# Patient Record
Sex: MALE | Race: WHITE | NOT HISPANIC OR LATINO | URBAN - METROPOLITAN AREA
[De-identification: names, ages, dates, MRNs, and addresses within clinical notes are randomized per-mention and may not be internally consistent; named-entity substitution may affect disease eponyms.]

---

## 2018-05-16 RX ORDER — GLUCOSAM/CHONDRO/HERB 149/HYAL 750-100 MG
2-3 TABLET ORAL DAILY
COMMUNITY
Start: 2012-04-04

## 2018-05-16 RX ORDER — ASPIRIN 81 MG/1
81 TABLET ORAL DAILY
COMMUNITY
Start: 2012-04-04

## 2018-05-16 RX ORDER — LOSARTAN POTASSIUM 50 MG/1
50 TABLET ORAL DAILY
COMMUNITY
Start: 2015-10-16 | End: 2020-10-02 | Stop reason: ALTCHOICE

## 2018-05-16 RX ORDER — MULTIVITAMIN
1 TABLET ORAL DAILY
COMMUNITY
Start: 2012-04-04

## 2018-05-16 RX ORDER — METHYLPREDNISOLONE 4 MG
500 TABLET, DOSE PACK ORAL DAILY
COMMUNITY
Start: 2017-11-06

## 2018-05-16 RX ORDER — LATANOPROST 50 UG/ML
1 SOLUTION/ DROPS OPHTHALMIC DAILY
COMMUNITY
Start: 2013-10-09

## 2018-05-16 RX ORDER — GLIMEPIRIDE 4 MG/1
4 TABLET ORAL 2 TIMES DAILY
COMMUNITY
Start: 2012-04-04

## 2018-05-16 RX ORDER — BLACK COHOSH ROOT 200 MG
500 CAPSULE ORAL DAILY
COMMUNITY
Start: 2012-04-04

## 2018-05-16 RX ORDER — VITAMIN E 268 MG
1 CAPSULE ORAL DAILY
COMMUNITY
Start: 2012-04-04

## 2018-05-16 RX ORDER — GABAPENTIN 300 MG/1
1 CAPSULE ORAL 2 TIMES DAILY
COMMUNITY
Start: 2017-04-17

## 2018-05-16 RX ORDER — METFORMIN HYDROCHLORIDE 750 MG/1
750 TABLET, EXTENDED RELEASE ORAL EVERY EVENING
COMMUNITY
Start: 2013-10-09

## 2018-05-16 RX ORDER — ATORVASTATIN CALCIUM 40 MG/1
40 TABLET, FILM COATED ORAL DAILY
COMMUNITY
Start: 2013-10-09

## 2018-05-16 RX ORDER — METOPROLOL SUCCINATE 25 MG/1
25 TABLET, EXTENDED RELEASE ORAL DAILY
COMMUNITY
Start: 2017-08-17 | End: 2018-09-06 | Stop reason: SDUPTHER

## 2018-05-16 RX ORDER — FUROSEMIDE 20 MG/1
0.5 TABLET ORAL AS NEEDED
COMMUNITY
Start: 2013-10-09 | End: 2018-12-03

## 2018-05-21 ENCOUNTER — OFFICE VISIT (OUTPATIENT)
Dept: CARDIOLOGY | Facility: CLINIC | Age: 83
End: 2018-05-21
Attending: INTERNAL MEDICINE
Payer: COMMERCIAL

## 2018-05-21 VITALS
HEART RATE: 67 BPM | DIASTOLIC BLOOD PRESSURE: 70 MMHG | HEIGHT: 67 IN | BODY MASS INDEX: 32.68 KG/M2 | SYSTOLIC BLOOD PRESSURE: 128 MMHG | WEIGHT: 208.2 LBS

## 2018-05-21 DIAGNOSIS — E78.2 MIXED HYPERLIPIDEMIA: ICD-10-CM

## 2018-05-21 DIAGNOSIS — I10 BENIGN ESSENTIAL HYPERTENSION: ICD-10-CM

## 2018-05-21 DIAGNOSIS — E78.5 DYSLIPIDEMIA: Primary | ICD-10-CM

## 2018-05-21 DIAGNOSIS — I25.10 CORONARY ARTERY DISEASE INVOLVING NATIVE CORONARY ARTERY OF NATIVE HEART WITHOUT ANGINA PECTORIS: ICD-10-CM

## 2018-05-21 PROCEDURE — 99214 OFFICE O/P EST MOD 30 MIN: CPT | Performed by: INTERNAL MEDICINE

## 2018-05-21 PROCEDURE — 93000 ELECTROCARDIOGRAM COMPLETE: CPT | Performed by: INTERNAL MEDICINE

## 2018-05-21 RX ORDER — BLOOD-GLUCOSE METER
KIT MISCELLANEOUS 2 TIMES DAILY
Refills: 2 | COMMUNITY
Start: 2018-03-28

## 2018-05-21 ASSESSMENT — ENCOUNTER SYMPTOMS
DYSPNEA ON EXERTION: 0
VOMITING: 0
ABDOMINAL PAIN: 0
ALTERED MENTAL STATUS: 0
CLAUDICATION: 0
SHORTNESS OF BREATH: 0
COUGH: 0
BACK PAIN: 0
SYNCOPE: 0
NEAR-SYNCOPE: 0
HEMATURIA: 0
DIARRHEA: 0
DIZZINESS: 0
WEAKNESS: 0
PND: 0
MYALGIAS: 0
IRREGULAR HEARTBEAT: 0
ORTHOPNEA: 0
NAUSEA: 0
BRUISES/BLEEDS EASILY: 0
PALPITATIONS: 0

## 2018-05-21 NOTE — ASSESSMENT & PLAN NOTE
Blood pressure is controlled and is less than 130/80.  Dimitris will continue on the losartan and Toprol.

## 2018-05-21 NOTE — ASSESSMENT & PLAN NOTE
No anginal symptoms or symptoms of congestive heart failure.    Dimitris does have some lower extremity edema and I told him if needed he can take an additional dose of the diuretic.    I also counseled him on low salt and trying to keep his legs elevated when possible.

## 2018-05-21 NOTE — PROGRESS NOTES
Cardiology Note       Reason for visit:Coronary artery disease   Solis returns today for follow-up accompanied by his son.  He has been doing well without any chest discomfort or shortness of breath.    He continues to go to the [facility] and does his water aerobics.    He tells me he has been going fishing and recently caught a 21 inch Trail.    There has not been any palpitations or dizziness.  He does get some lower extremity edema but this has been chronic and unchanged.  There has not been any PND or orthopnea.    He has not taken his diuretic as of yet today.  He does have 1+ edema of the lower extremities bilaterally.           Past Medical History:   Diagnosis Date   • CAD (coronary artery disease)    • Cellulitis 10/2017    LLE   • Diabetes (CMS/HCC) ([facility])    • Hypertension    • Lipid disorder    • Peripheral neuropathy (CMS/[facility])    • PVD (peripheral vascular disease) (CMS/Formerly [facility]) ([facility])      Past Surgical History:   Procedure Laterality Date   • CORONARY ANGIOPLASTY  1996    PTCA of LAD     No known allergies  Current Outpatient Prescriptions   Medication Sig Dispense Refill   • ascorbic acid (ascorbic acid with justino hips) 500 mg tablet Take 500 mg by mouth daily.     • aspirin (ASPIRIN LOW DOSE) 81 mg enteric coated tablet Take 81 mg by mouth daily.     • atorvastatin (LIPITOR) 40 mg tablet Take 40 mg by mouth daily.     • FREESTYLE LITE STRIPS strip 2 (two) times a day. USE 1 BY MISC.(NON-DRUG COMBO ROUTE) ROUTE 2 TIMES EVERY DAY  2   • furosemide (LASIX) 20 mg tablet Take 0.5 tablets by mouth as needed.     • gabapentin (NEURONTIN) 300 mg capsule Take 2 capsules by mouth daily.     • glimepiride (AMARYL) 4 mg tablet Take 4 mg by mouth 2 (two) times a day.     • glucosamine sulfate (GLUCOSAMINE) 500 mg tablet Take 500 mg by mouth daily.     • latanoprost (XALATAN) 0.005 % ophthalmic solution Administer 1 drop into affected eye(s) daily.     • losartan (COZAAR) 50 mg tablet Take 50 mg by mouth daily.     •  metFORMIN XR (GLUCOPHAGE-XR) 750 mg 24 hr tablet Take 750 mg by mouth every evening. With evening meal     • metoprolol succinate XL (TOPROL XL) 25 mg 24 hr tablet Take 25 mg by mouth daily.     • multivitamin (THERAGRAN) tablet Take 1 tablet by mouth daily. With food     • omega 3-dha-epa-fish oil 1,000 mg (120 mg-180 mg) capsule Take 2-3 tablets by mouth daily.     • vitamin E 400 unit capsule Take 1 tablet by mouth daily.       No current facility-administered medications for this visit.      Social History     Social History   • Marital status:      Spouse name: N/A   • Number of children: N/A   • Years of education: N/A     Social History Main Topics   • Smoking status: Never Smoker   • Smokeless tobacco: Never Used   • Alcohol use None   • Drug use: Unknown   • Sexual activity: Not Asked     Other Topics Concern   • None     Social History Narrative   • None     Family History   Problem Relation Age of Onset   • Heart attack Neg Hx          Review of Systems   Constitution: Negative for weakness.   HENT: Negative for nosebleeds.    Eyes: Negative for visual disturbance.   Cardiovascular: Negative for chest pain, claudication, dyspnea on exertion, irregular heartbeat, leg swelling, near-syncope, orthopnea, palpitations, paroxysmal nocturnal dyspnea and syncope.   Respiratory: Negative for cough and shortness of breath.    Hematologic/Lymphatic: Does not bruise/bleed easily.   Skin: Negative for rash.   Musculoskeletal: Positive for joint pain. Negative for back pain and myalgias.   Gastrointestinal: Negative for abdominal pain, diarrhea, nausea and vomiting.   Genitourinary: Negative for hematuria.   Neurological: Negative for dizziness.   Psychiatric/Behavioral: Negative for altered mental status.   Allergic/Immunologic: Negative for hives.      Objective    Vitals:    05/21/18 0924   BP: 128/70   Pulse: 67      Physical Exam   Constitutional: He is oriented to person, place, and time. He appears  well-developed and well-nourished. He is cooperative.   HENT:   Head: Normocephalic and atraumatic.   Eyes: No scleral icterus.   Neck: No JVD present. Carotid bruit is not present.   Cardiovascular: Normal rate, regular rhythm, S1 normal and S2 normal.  Exam reveals gallop and S4. Exam reveals no friction rub.    No murmur heard.  Pulses:       Carotid pulses are 2+ on the right side, and 2+ on the left side.       Dorsalis pedis pulses are 1+ on the right side, and 1+ on the left side.        Posterior tibial pulses are 1+ on the right side, and 1+ on the left side.   Pulmonary/Chest: Effort normal and breath sounds normal.   Abdominal: Soft. Bowel sounds are normal. There is no tenderness.   Musculoskeletal: He exhibits edema.   Trace 1+ edema of the lower extemities bilaterally   Neurological: He is alert and oriented to person, place, and time.   Skin: Skin is warm and dry.   Psychiatric: He has a normal mood and affect. His behavior is normal.         No results found for: WBC, HGB, PLT, CHOL, TRIG, HDL, LDLDIRECT, ALT, AST, NA, K, CREATININE, TSH, INR, GLUF, HGBA1C        Cardiovascular Procedures:  CATH LAB:  PCI (PTCA LAD) - 1996   ECHO/MUGA:  Echo (Left ventricular hypertrophy. Ejection fraction 55-60%. Aortic sclerosis.) - 10/3/2012   Echo (EF 0.60 (60%), Hypertensive heart disease.  Left ventricular hypertrophy.  Aortic sclerosis.  Decreased left ventricular compliance.) - 10/21/2015   STRESS TESTS:  Prasad MPI (Normal nuclear perfusion stress test. Images reveal normal wall motion. Ejection fraction 64%.) - 10/5/2012         ECG.  Normal sinus rhythm, first-degree AV block, right bundle branch block and nonspecific T-wave abnormality.  No change compared to previous EKG.     Assessment/Plan    Coronary artery disease involving native coronary artery without angina pectoris  No anginal symptoms or symptoms of congestive heart failure.    Dimitris does have some lower extremity edema and I told him if needed he  can take an additional dose of the diuretic.    I also counseled him on low salt and trying to keep his legs elevated when possible.    Benign essential hypertension  Blood pressure is controlled and is less than 130/80.  Dimitris will continue on the losartan and Toprol.    Mixed hyperlipidemia  Continue atorvastatin 40 mg daily.                Thank you for allowing me to participate in the care of this patient.  If you have any questions please don't hesitate to contact me.     Dc Renner MD [facility]   5/21/2018  10:16 AM

## 2018-05-21 NOTE — LETTER
May 21, 2018     Alexis Adams MD  [address] [facility] [address]    Patient: Solis Dolan   YOB: 1925   Date of Visit: 5/21/2018       Dear Dr. Adams:    Thank you for referring Solis Dolan to me for evaluation. Below are my notes for this consultation.    If you have questions, please do not hesitate to call me. I look forward to following your patient along with you.         Sincerely,        Dc Renner MD        CC: No Recipients  Dc Renner MD  5/21/2018 10:17 AM  Signed     Cardiology Note       Reason for visit:Coronary artery disease   Solis returns today for follow-up accompanied by his son.  He has been doing well without any chest discomfort or shortness of breath.    He continues to go to the [facility] and does his water aerobics.    He tells me he has been going fishing and recently caught a 21 inch Trail.    There has not been any palpitations or dizziness.  He does get some lower extremity edema but this has been chronic and unchanged.  There has not been any PND or orthopnea.    He has not taken his diuretic as of yet today.  He does have 1+ edema of the lower extremities bilaterally.           Past Medical History:   Diagnosis Date   • CAD (coronary artery disease)    • Cellulitis 10/2017    LLE   • Diabetes (CMS/HCC) ([facility])    • Hypertension    • Lipid disorder    • Peripheral neuropathy (CMS/HCC)    • PVD (peripheral vascular disease) (CMS/HCC) ([facility])      Past Surgical History:   Procedure Laterality Date   • CORONARY ANGIOPLASTY  1996    PTCA of LAD     No known allergies  Current Outpatient Prescriptions   Medication Sig Dispense Refill   • ascorbic acid (ascorbic acid with justino hips) 500 mg tablet Take 500 mg by mouth daily.     • aspirin (ASPIRIN LOW DOSE) 81 mg enteric coated tablet Take 81 mg by mouth daily.     • atorvastatin (LIPITOR) 40 mg tablet Take 40 mg by mouth daily.     • FREESTYLE LITE STRIPS strip 2 (two) times a day. USE 1 BY MISC.(NON-DRUG  COMBO ROUTE) ROUTE 2 TIMES EVERY DAY  2   • furosemide (LASIX) 20 mg tablet Take 0.5 tablets by mouth as needed.     • gabapentin (NEURONTIN) 300 mg capsule Take 2 capsules by mouth daily.     • glimepiride (AMARYL) 4 mg tablet Take 4 mg by mouth 2 (two) times a day.     • glucosamine sulfate (GLUCOSAMINE) 500 mg tablet Take 500 mg by mouth daily.     • latanoprost (XALATAN) 0.005 % ophthalmic solution Administer 1 drop into affected eye(s) daily.     • losartan (COZAAR) 50 mg tablet Take 50 mg by mouth daily.     • metFORMIN XR (GLUCOPHAGE-XR) 750 mg 24 hr tablet Take 750 mg by mouth every evening. With evening meal     • metoprolol succinate XL (TOPROL XL) 25 mg 24 hr tablet Take 25 mg by mouth daily.     • multivitamin (THERAGRAN) tablet Take 1 tablet by mouth daily. With food     • omega 3-dha-epa-fish oil 1,000 mg (120 mg-180 mg) capsule Take 2-3 tablets by mouth daily.     • vitamin E 400 unit capsule Take 1 tablet by mouth daily.       No current facility-administered medications for this visit.      Social History     Social History   • Marital status:      Spouse name: N/A   • Number of children: N/A   • Years of education: N/A     Social History Main Topics   • Smoking status: Never Smoker   • Smokeless tobacco: Never Used   • Alcohol use None   • Drug use: Unknown   • Sexual activity: Not Asked     Other Topics Concern   • None     Social History Narrative   • None     Family History   Problem Relation Age of Onset   • Heart attack Neg Hx          Review of Systems   Constitution: Negative for weakness.   HENT: Negative for nosebleeds.    Eyes: Negative for visual disturbance.   Cardiovascular: Negative for chest pain, claudication, dyspnea on exertion, irregular heartbeat, leg swelling, near-syncope, orthopnea, palpitations, paroxysmal nocturnal dyspnea and syncope.   Respiratory: Negative for cough and shortness of breath.    Hematologic/Lymphatic: Does not bruise/bleed easily.   Skin: Negative  for rash.   Musculoskeletal: Positive for joint pain. Negative for back pain and myalgias.   Gastrointestinal: Negative for abdominal pain, diarrhea, nausea and vomiting.   Genitourinary: Negative for hematuria.   Neurological: Negative for dizziness.   Psychiatric/Behavioral: Negative for altered mental status.   Allergic/Immunologic: Negative for hives.      Objective    Vitals:    05/21/18 0924   BP: 128/70   Pulse: 67      Physical Exam   Constitutional: He is oriented to person, place, and time. He appears well-developed and well-nourished. He is cooperative.   HENT:   Head: Normocephalic and atraumatic.   Eyes: No scleral icterus.   Neck: No JVD present. Carotid bruit is not present.   Cardiovascular: Normal rate, regular rhythm, S1 normal and S2 normal.  Exam reveals gallop and S4. Exam reveals no friction rub.    No murmur heard.  Pulses:       Carotid pulses are 2+ on the right side, and 2+ on the left side.       Dorsalis pedis pulses are 1+ on the right side, and 1+ on the left side.        Posterior tibial pulses are 1+ on the right side, and 1+ on the left side.   Pulmonary/Chest: Effort normal and breath sounds normal.   Abdominal: Soft. Bowel sounds are normal. There is no tenderness.   Musculoskeletal: He exhibits edema.   Trace 1+ edema of the lower extemities bilaterally   Neurological: He is alert and oriented to person, place, and time.   Skin: Skin is warm and dry.   Psychiatric: He has a normal mood and affect. His behavior is normal.         No results found for: WBC, HGB, PLT, CHOL, TRIG, HDL, LDLDIRECT, ALT, AST, NA, K, CREATININE, TSH, INR, GLUF, HGBA1C        Cardiovascular Procedures:  CATH LAB:  PCI (PTCA LAD) - 1996   ECHO/MUGA:  Echo (Left ventricular hypertrophy. Ejection fraction 55-60%. Aortic sclerosis.) - 10/3/2012   Echo (EF 0.60 (60%), Hypertensive heart disease.  Left ventricular hypertrophy.  Aortic sclerosis.  Decreased left ventricular compliance.) - 10/21/2015   STRESS  TESTS:  Prasad MPI (Normal nuclear perfusion stress test. Images reveal normal wall motion. Ejection fraction 64%.) - 10/5/2012         ECG.  Normal sinus rhythm, first-degree AV block, right bundle branch block and nonspecific T-wave abnormality.  No change compared to previous EKG.     Assessment/Plan    Coronary artery disease involving native coronary artery without angina pectoris  No anginal symptoms or symptoms of congestive heart failure.    Dimitris does have some lower extremity edema and I told him if needed he can take an additional dose of the diuretic.    I also counseled him on low salt and trying to keep his legs elevated when possible.    Benign essential hypertension  Blood pressure is controlled and is less than 130/80.  Dimitris will continue on the losartan and Toprol.    Mixed hyperlipidemia  Continue atorvastatin 40 mg daily.                Thank you for allowing me to participate in the care of this patient.  If you have any questions please don't hesitate to contact me.     Dc Renner MD [facility]   5/21/2018  10:16 AM

## 2018-09-06 RX ORDER — METOPROLOL SUCCINATE 25 MG/1
25 TABLET, EXTENDED RELEASE ORAL DAILY
Qty: 90 TABLET | Refills: 3 | Status: SHIPPED | OUTPATIENT
Start: 2018-09-06 | End: 2018-12-03

## 2018-12-03 ENCOUNTER — OFFICE VISIT (OUTPATIENT)
Dept: CARDIOLOGY | Facility: CLINIC | Age: 83
End: 2018-12-03
Payer: COMMERCIAL

## 2018-12-03 VITALS
WEIGHT: 206 LBS | HEART RATE: 69 BPM | SYSTOLIC BLOOD PRESSURE: 138 MMHG | BODY MASS INDEX: 33.11 KG/M2 | RESPIRATION RATE: 16 BRPM | HEIGHT: 66 IN | DIASTOLIC BLOOD PRESSURE: 58 MMHG

## 2018-12-03 DIAGNOSIS — I10 BENIGN ESSENTIAL HYPERTENSION: Primary | ICD-10-CM

## 2018-12-03 DIAGNOSIS — I25.10 CORONARY ARTERY DISEASE INVOLVING NATIVE CORONARY ARTERY OF NATIVE HEART WITHOUT ANGINA PECTORIS: ICD-10-CM

## 2018-12-03 DIAGNOSIS — E78.2 MIXED HYPERLIPIDEMIA: ICD-10-CM

## 2018-12-03 PROCEDURE — 99214 OFFICE O/P EST MOD 30 MIN: CPT | Performed by: INTERNAL MEDICINE

## 2018-12-03 PROCEDURE — 93000 ELECTROCARDIOGRAM COMPLETE: CPT | Performed by: INTERNAL MEDICINE

## 2018-12-03 ASSESSMENT — ENCOUNTER SYMPTOMS
IRREGULAR HEARTBEAT: 0
COUGH: 0
NEAR-SYNCOPE: 0
DYSPNEA ON EXERTION: 1
SYNCOPE: 0
SHORTNESS OF BREATH: 0
CLAUDICATION: 0
NAUSEA: 0
BACK PAIN: 0
PARESTHESIAS: 1
ALTERED MENTAL STATUS: 0
PALPITATIONS: 0
MYALGIAS: 0
ABDOMINAL PAIN: 0
WEAKNESS: 0
PND: 0
DIZZINESS: 0
HEMATURIA: 0
ORTHOPNEA: 0
DIARRHEA: 0
BRUISES/BLEEDS EASILY: 0
VOMITING: 0

## 2018-12-03 NOTE — LETTER
December 3, 2018     Alexis Adams MD  [address] [facility] [address]    Patient: Solis Dolan   YOB: 1925   Date of Visit: 12/3/2018       Dear Dr. Adams:    Thank you for referring Solis Dolan to me for evaluation. Below are my notes for this consultation.    If you have questions, please do not hesitate to call me. I look forward to following your patient along with you.         Sincerely,        Dc Renner MD        CC: No Recipients  Dc Renner MD  12/3/2018  9:59 AM  Signed     Cardiology Note       Reason for visit: Coronary artery disease.   Solis returns today for follow-up for his coronary artery disease accompanied by his son.    He has been doing well without any anginal symptoms.    He continues to be active and does daily exercise, sudoku and word finding puzzles  to keep himself mentally active and sharp.    His son tells me that he had been in the hospital over the summer with a cellulitis of his left lower extremity but otherwise has not had any problems.    There is not been any PND, orthopnea or chest discomfort.           Past Medical History:   Diagnosis Date   • CAD (coronary artery disease)    • Cellulitis 10/2017    LLE   • Diabetes (CMS/HCC) ([facility])    • Hypertension    • Lipid disorder    • Peripheral neuropathy    • PVD (peripheral vascular disease) (CMS/[facility]) ([facility])      Past Surgical History:   Procedure Laterality Date   • CORONARY ANGIOPLASTY  1996    PTCA of LAD     No known allergies  Current Outpatient Prescriptions   Medication Sig Dispense Refill   • apixaban (ELIQUIS) 5 mg tablet Take 5 mg by mouth 2 (two) times a day.     • ascorbic acid (ascorbic acid with justino hips) 500 mg tablet Take 500 mg by mouth daily.     • aspirin (ASPIRIN LOW DOSE) 81 mg enteric coated tablet Take 81 mg by mouth daily.     • atorvastatin (LIPITOR) 40 mg tablet Take 40 mg by mouth daily.     • FREESTYLE LITE STRIPS strip 2 (two) times a day. USE 1 BY MISC.(NON-DRUG  COMBO ROUTE) ROUTE 2 TIMES EVERY DAY  2   • gabapentin (NEURONTIN) 300 mg capsule Take 2 capsules by mouth daily.     • glimepiride (AMARYL) 4 mg tablet Take 4 mg by mouth 2 (two) times a day.     • glucosamine sulfate (GLUCOSAMINE) 500 mg tablet Take 500 mg by mouth daily.     • latanoprost (XALATAN) 0.005 % ophthalmic solution Administer 1 drop into affected eye(s) daily.     • losartan (COZAAR) 50 mg tablet Take 50 mg by mouth daily.     • metFORMIN XR (GLUCOPHAGE-XR) 750 mg 24 hr tablet Take 750 mg by mouth every evening. With evening meal     • multivitamin (THERAGRAN) tablet Take 1 tablet by mouth daily. With food     • omega 3-dha-epa-fish oil 1,000 mg (120 mg-180 mg) capsule Take 2-3 tablets by mouth daily.     • vitamin E 400 unit capsule Take 1 tablet by mouth daily.       No current facility-administered medications for this visit.      Social History     Social History   • Marital status:      Spouse name: N/A   • Number of children: N/A   • Years of education: N/A     Social History Main Topics   • Smoking status: Never Smoker   • Smokeless tobacco: Never Used   • Alcohol use None   • Drug use: Unknown   • Sexual activity: Not Asked     Other Topics Concern   • None     Social History Narrative   • None     Family History   Problem Relation Age of Onset   • Heart attack Neg Hx          Review of Systems   Constitution: Negative for weakness.   HENT: Negative for nosebleeds.    Eyes: Negative for visual disturbance.   Cardiovascular: Positive for dyspnea on exertion. Negative for chest pain, claudication, irregular heartbeat, leg swelling, near-syncope, orthopnea, palpitations, paroxysmal nocturnal dyspnea and syncope.   Respiratory: Negative for cough and shortness of breath.    Hematologic/Lymphatic: Does not bruise/bleed easily.   Skin: Negative for rash.   Musculoskeletal: Negative for back pain, joint pain and myalgias.   Gastrointestinal: Negative for abdominal pain, diarrhea, nausea and  vomiting.   Genitourinary: Negative for hematuria.   Neurological: Positive for paresthesias. Negative for dizziness.   Psychiatric/Behavioral: Negative for altered mental status.   Allergic/Immunologic: Negative for hives.      Objective    Vitals:    12/03/18 0928   BP: (!) 138/58   Pulse: 69   Resp: 16      Wt Readings from Last 3 Encounters:   12/03/18 93.4 kg (206 lb)   05/21/18 94.4 kg (208 lb 3.2 oz)      Physical Exam   Constitutional: He is oriented to person, place, and time. He appears well-developed and well-nourished. He is cooperative.   HENT:   Head: Normocephalic and atraumatic.   Eyes: No scleral icterus.   Neck: No JVD present. Carotid bruit is not present.   Cardiovascular: Normal rate, regular rhythm, S1 normal and S2 normal.  Exam reveals gallop and S4. Exam reveals no friction rub.    No murmur heard.  Pulses:       Carotid pulses are 2+ on the right side, and 2+ on the left side.       Dorsalis pedis pulses are 2+ on the right side, and 2+ on the left side.        Posterior tibial pulses are 2+ on the right side, and 2+ on the left side.   Pulmonary/Chest: Effort normal and breath sounds normal.   Abdominal: Soft. Bowel sounds are normal. There is no tenderness.   Musculoskeletal: He exhibits no edema.   Neurological: He is alert and oriented to person, place, and time.   Skin: Skin is warm and dry.   Chronic venous stasis changes of the lower extremities bilaterally   Psychiatric: He has a normal mood and affect. His behavior is normal.               Cardiovascular Procedures:  CATH LAB:  PCI (PTCA LAD) - 1996   ECHO/MUGA:  Echo (Left ventricular hypertrophy. Ejection fraction 55-60%. Aortic sclerosis.) - 10/3/2012   Echo (EF 0.60 (60%), Hypertensive heart disease.  Left ventricular hypertrophy.  Aortic sclerosis.  Decreased left ventricular compliance.) - 10/21/2015   STRESS TESTS:  Prasad MPI (Normal nuclear perfusion stress test. Images reveal normal wall motion. Ejection fraction 64%.) -  10/5/2012          ECG.  Normal sinus rhythm, leftward axis and right bundle branch block.  No change compared to previous EKG.     Assessment/Plan    Coronary artery disease involving native coronary artery without angina pectoris  Solis has been stable without any anginal symptoms.  He will continue on the aspirin and high intensity statin therapy.    Benign essential hypertension  His blood pressure is mildly elevated today 138 but he tells me otherwise his blood pressure has been controlled.    He will continue on the losartan 50 mg daily.  If needed this can be increased to 100 mg should his blood pressure be elevated on subsequent visits.    Mixed hyperlipidemia  Continue atorvastatin 40 mg daily.  If you could forward a copy of his lipid profile when he has a performed I would greatly appreciate it.      Solis will be coming to you for his day-to-day care.          Thank you for allowing me to participate in the care of this patient.  If you have any questions please don't hesitate to contact me.     Dc Renner MD [facility]   12/3/2018  9:58 AM

## 2018-12-03 NOTE — PROGRESS NOTES
Cardiology Note       Reason for visit: Coronary artery disease.   Solis returns today for follow-up for his coronary artery disease accompanied by his son.    He has been doing well without any anginal symptoms.    He continues to be active and does daily exercise, sudoku and word finding puzzles  to keep himself mentally active and sharp.    His son tells me that he had been in the hospital over the summer with a cellulitis of his left lower extremity but otherwise has not had any problems.    There is not been any PND, orthopnea or chest discomfort.           Past Medical History:   Diagnosis Date   • CAD (coronary artery disease)    • Cellulitis 10/2017    LLE   • Diabetes (CMS/[facility]) ([facility])    • Hypertension    • Lipid disorder    • Peripheral neuropathy    • PVD (peripheral vascular disease) (CMS/[facility]) ([facility])      Past Surgical History:   Procedure Laterality Date   • CORONARY ANGIOPLASTY  1996    PTCA of LAD     No known allergies  Current Outpatient Prescriptions   Medication Sig Dispense Refill   • apixaban (ELIQUIS) 5 mg tablet Take 5 mg by mouth 2 (two) times a day.     • ascorbic acid (ascorbic acid with justino hips) 500 mg tablet Take 500 mg by mouth daily.     • aspirin (ASPIRIN LOW DOSE) 81 mg enteric coated tablet Take 81 mg by mouth daily.     • atorvastatin (LIPITOR) 40 mg tablet Take 40 mg by mouth daily.     • FREESTYLE LITE STRIPS strip 2 (two) times a day. USE 1 BY MISC.(NON-DRUG COMBO ROUTE) ROUTE 2 TIMES EVERY DAY  2   • gabapentin (NEURONTIN) 300 mg capsule Take 2 capsules by mouth daily.     • glimepiride (AMARYL) 4 mg tablet Take 4 mg by mouth 2 (two) times a day.     • glucosamine sulfate (GLUCOSAMINE) 500 mg tablet Take 500 mg by mouth daily.     • latanoprost (XALATAN) 0.005 % ophthalmic solution Administer 1 drop into affected eye(s) daily.     • losartan (COZAAR) 50 mg tablet Take 50 mg by mouth daily.     • metFORMIN XR (GLUCOPHAGE-XR) 750 mg 24 hr tablet Take 750 mg by mouth every  evening. With evening meal     • multivitamin (THERAGRAN) tablet Take 1 tablet by mouth daily. With food     • omega 3-dha-epa-fish oil 1,000 mg (120 mg-180 mg) capsule Take 2-3 tablets by mouth daily.     • vitamin E 400 unit capsule Take 1 tablet by mouth daily.       No current facility-administered medications for this visit.      Social History     Social History   • Marital status:      Spouse name: N/A   • Number of children: N/A   • Years of education: N/A     Social History Main Topics   • Smoking status: Never Smoker   • Smokeless tobacco: Never Used   • Alcohol use None   • Drug use: Unknown   • Sexual activity: Not Asked     Other Topics Concern   • None     Social History Narrative   • None     Family History   Problem Relation Age of Onset   • Heart attack Neg Hx          Review of Systems   Constitution: Negative for weakness.   HENT: Negative for nosebleeds.    Eyes: Negative for visual disturbance.   Cardiovascular: Positive for dyspnea on exertion. Negative for chest pain, claudication, irregular heartbeat, leg swelling, near-syncope, orthopnea, palpitations, paroxysmal nocturnal dyspnea and syncope.   Respiratory: Negative for cough and shortness of breath.    Hematologic/Lymphatic: Does not bruise/bleed easily.   Skin: Negative for rash.   Musculoskeletal: Negative for back pain, joint pain and myalgias.   Gastrointestinal: Negative for abdominal pain, diarrhea, nausea and vomiting.   Genitourinary: Negative for hematuria.   Neurological: Positive for paresthesias. Negative for dizziness.   Psychiatric/Behavioral: Negative for altered mental status.   Allergic/Immunologic: Negative for hives.      Objective    Vitals:    12/03/18 0928   BP: (!) 138/58   Pulse: 69   Resp: 16      Wt Readings from Last 3 Encounters:   12/03/18 93.4 kg (206 lb)   05/21/18 94.4 kg (208 lb 3.2 oz)      Physical Exam   Constitutional: He is oriented to person, place, and time. He appears well-developed and  well-nourished. He is cooperative.   HENT:   Head: Normocephalic and atraumatic.   Eyes: No scleral icterus.   Neck: No JVD present. Carotid bruit is not present.   Cardiovascular: Normal rate, regular rhythm, S1 normal and S2 normal.  Exam reveals gallop and S4. Exam reveals no friction rub.    No murmur heard.  Pulses:       Carotid pulses are 2+ on the right side, and 2+ on the left side.       Dorsalis pedis pulses are 2+ on the right side, and 2+ on the left side.        Posterior tibial pulses are 2+ on the right side, and 2+ on the left side.   Pulmonary/Chest: Effort normal and breath sounds normal.   Abdominal: Soft. Bowel sounds are normal. There is no tenderness.   Musculoskeletal: He exhibits no edema.   Neurological: He is alert and oriented to person, place, and time.   Skin: Skin is warm and dry.   Chronic venous stasis changes of the lower extremities bilaterally   Psychiatric: He has a normal mood and affect. His behavior is normal.               Cardiovascular Procedures:  CATH LAB:  PCI (PTCA LAD) - 1996   ECHO/MUGA:  Echo (Left ventricular hypertrophy. Ejection fraction 55-60%. Aortic sclerosis.) - 10/3/2012   Echo (EF 0.60 (60%), Hypertensive heart disease.  Left ventricular hypertrophy.  Aortic sclerosis.  Decreased left ventricular compliance.) - 10/21/2015   STRESS TESTS:  Prasad MPI (Normal nuclear perfusion stress test. Images reveal normal wall motion. Ejection fraction 64%.) - 10/5/2012          ECG.  Normal sinus rhythm, leftward axis and right bundle branch block.  No change compared to previous EKG.     Assessment/Plan    Coronary artery disease involving native coronary artery without angina pectoris  Solis has been stable without any anginal symptoms.  He will continue on the aspirin and high intensity statin therapy.    Benign essential hypertension  His blood pressure is mildly elevated today 138 but he tells me otherwise his blood pressure has been controlled.    He will continue  on the losartan 50 mg daily.  If needed this can be increased to 100 mg should his blood pressure be elevated on subsequent visits.    Mixed hyperlipidemia  Continue atorvastatin 40 mg daily.  If you could forward a copy of his lipid profile when he has a performed I would greatly appreciate it.      Solis will be coming to you for his day-to-day care.          Thank you for allowing me to participate in the care of this patient.  If you have any questions please don't hesitate to contact me.     Dc Renner MD [facility]   12/3/2018  9:58 AM

## 2018-12-03 NOTE — ASSESSMENT & PLAN NOTE
Continue atorvastatin 40 mg daily.  If you could forward a copy of his lipid profile when he has a performed I would greatly appreciate it.

## 2018-12-03 NOTE — ASSESSMENT & PLAN NOTE
His blood pressure is mildly elevated today 138 but he tells me otherwise his blood pressure has been controlled.    He will continue on the losartan 50 mg daily.  If needed this can be increased to 100 mg should his blood pressure be elevated on subsequent visits.

## 2018-12-03 NOTE — ASSESSMENT & PLAN NOTE
Solis has been stable without any anginal symptoms.  He will continue on the aspirin and high intensity statin therapy.

## 2019-06-05 ENCOUNTER — OFFICE VISIT (OUTPATIENT)
Dept: CARDIOLOGY | Facility: CLINIC | Age: 84
End: 2019-06-05
Payer: COMMERCIAL

## 2019-06-05 VITALS
WEIGHT: 200 LBS | HEART RATE: 75 BPM | HEIGHT: 66 IN | SYSTOLIC BLOOD PRESSURE: 130 MMHG | DIASTOLIC BLOOD PRESSURE: 64 MMHG | RESPIRATION RATE: 18 BRPM | BODY MASS INDEX: 32.14 KG/M2

## 2019-06-05 DIAGNOSIS — I10 ESSENTIAL HYPERTENSION: Primary | ICD-10-CM

## 2019-06-05 DIAGNOSIS — I10 BENIGN ESSENTIAL HYPERTENSION: ICD-10-CM

## 2019-06-05 DIAGNOSIS — I25.10 CORONARY ARTERY DISEASE INVOLVING NATIVE CORONARY ARTERY OF NATIVE HEART WITHOUT ANGINA PECTORIS: ICD-10-CM

## 2019-06-05 DIAGNOSIS — E78.2 MIXED HYPERLIPIDEMIA: ICD-10-CM

## 2019-06-05 PROCEDURE — 99214 OFFICE O/P EST MOD 30 MIN: CPT | Performed by: INTERNAL MEDICINE

## 2019-06-05 PROCEDURE — 93000 ELECTROCARDIOGRAM COMPLETE: CPT | Performed by: INTERNAL MEDICINE

## 2019-06-05 ASSESSMENT — ENCOUNTER SYMPTOMS
ORTHOPNEA: 0
PND: 0
MYALGIAS: 0
COUGH: 0
DYSPNEA ON EXERTION: 1
NAUSEA: 0
BACK PAIN: 0
IRREGULAR HEARTBEAT: 0
PALPITATIONS: 0
DIARRHEA: 0
SHORTNESS OF BREATH: 0
BRUISES/BLEEDS EASILY: 0
CLAUDICATION: 0
NEAR-SYNCOPE: 0
ALTERED MENTAL STATUS: 0
HEMATURIA: 0
ABDOMINAL PAIN: 0
SYNCOPE: 0
DIZZINESS: 0
VOMITING: 0
WEAKNESS: 0

## 2019-06-05 NOTE — PROGRESS NOTES
Cardiology Note       Reason for visit: Coronary artery disease.   Solis returns today for follow-up accompanied by his son.  He tells me that overall he has been doing well.  He continues to go to the  and do water aerobics.    He does tell me that he feels that he gets slightly more dyspneic with moderate effort such as 125 feet of walking that he did compared to 6 months ago.  He has not had any rest or nocturnal symptoms.  He has not had any chest discomfort.  There has not been any PND orthopnea.  He does get some lower extremity edema which has been chronic and he takes his Lasix on a regular basis.               Past Medical History:   Diagnosis Date   • CAD (coronary artery disease)    • Cellulitis 10/2017    LLE   • Diabetes (CMS/[facility]) ([facility])    • Hypertension    • Lipid disorder    • Peripheral neuropathy    • PVD (peripheral vascular disease) (CMS/[facility]) ([facility])      Past Surgical History:   Procedure Laterality Date   • CORONARY ANGIOPLASTY  1996    PTCA of LAD     No known allergies  Current Outpatient Prescriptions   Medication Sig Dispense Refill   • ascorbic acid (ascorbic acid with justino hips) 500 mg tablet Take 500 mg by mouth daily.     • aspirin (ASPIRIN LOW DOSE) 81 mg enteric coated tablet Take 81 mg by mouth daily.     • atorvastatin (LIPITOR) 40 mg tablet Take 40 mg by mouth daily.     • cholecalciferol, vitamin D3, (VITAMIN D3 ORAL) Take by mouth.     • FREESTYLE LITE STRIPS strip 2 (two) times a day. USE 1 BY MISC.(NON-DRUG COMBO ROUTE) ROUTE 2 TIMES EVERY DAY  2   • gabapentin (NEURONTIN) 300 mg capsule Take 1 capsule by mouth 2 (two) times a day.       • glimepiride (AMARYL) 4 mg tablet Take 4 mg by mouth 2 (two) times a day.     • glucosamine sulfate (GLUCOSAMINE) 500 mg tablet Take 500 mg by mouth daily.     • latanoprost (XALATAN) 0.005 % ophthalmic solution Administer 1 drop into affected eye(s) daily.     • losartan (COZAAR) 50 mg tablet Take 50 mg by mouth daily.     • metFORMIN XR  (GLUCOPHAGE-XR) 750 mg 24 hr tablet Take 750 mg by mouth every evening. With evening meal     • multivitamin (THERAGRAN) tablet Take 1 tablet by mouth daily. With food     • omega 3-dha-epa-fish oil 1,000 mg (120 mg-180 mg) capsule Take 2-3 tablets by mouth daily.     • vitamin E 400 unit capsule Take 1 tablet by mouth daily.       No current facility-administered medications for this visit.      Social History     Social History   • Marital status:      Spouse name: N/A   • Number of children: N/A   • Years of education: N/A     Social History Main Topics   • Smoking status: Never Smoker   • Smokeless tobacco: Never Used   • Alcohol use None   • Drug use: Unknown   • Sexual activity: Not Asked     Other Topics Concern   • None     Social History Narrative   • None     Family History   Problem Relation Age of Onset   • Heart attack Neg Hx          Review of Systems   Constitution: Negative for weakness.   HENT: Negative for nosebleeds.    Eyes: Negative for visual disturbance.   Cardiovascular: Positive for dyspnea on exertion. Negative for chest pain, claudication, irregular heartbeat, leg swelling, near-syncope, orthopnea, palpitations, paroxysmal nocturnal dyspnea and syncope.   Respiratory: Negative for cough and shortness of breath.    Hematologic/Lymphatic: Does not bruise/bleed easily.   Skin: Negative for rash.   Musculoskeletal: Negative for back pain, joint pain and myalgias.   Gastrointestinal: Negative for abdominal pain, diarrhea, nausea and vomiting.   Genitourinary: Negative for hematuria.   Neurological: Negative for dizziness.   Psychiatric/Behavioral: Negative for altered mental status.   Allergic/Immunologic: Negative for hives.      Objective    Vitals:    06/05/19 0857   BP: 130/64   Pulse: 75   Resp: 18      Wt Readings from Last 3 Encounters:   06/05/19 90.7 kg (200 lb)   12/03/18 93.4 kg (206 lb)   05/21/18 94.4 kg (208 lb 3.2 oz)      Physical Exam   Constitutional: He is oriented to  person, place, and time. He appears well-developed and well-nourished. He is cooperative.   HENT:   Head: Normocephalic and atraumatic.   Eyes: No scleral icterus.   Neck: No JVD present. Carotid bruit is not present.   Cardiovascular: Normal rate, regular rhythm, S1 normal and S2 normal.  Exam reveals gallop and S4. Exam reveals no friction rub.    No murmur heard.  Pulses:       Carotid pulses are 2+ on the right side, and 2+ on the left side.       Dorsalis pedis pulses are 2+ on the right side, and 2+ on the left side.        Posterior tibial pulses are 2+ on the right side, and 2+ on the left side.   Pulmonary/Chest: Effort normal and breath sounds normal.   Abdominal: Soft. Bowel sounds are normal. There is no tenderness.   Musculoskeletal: Normal range of motion. He exhibits edema.   Trace edema of the lower extremities bilaterally   Neurological: He is alert and oriented to person, place, and time.   Skin: Skin is warm and dry.   Psychiatric: He has a normal mood and affect. His behavior is normal.               Cardiovascular Procedures:  CATH LAB:  PCI (PTCA LAD) - 1996   ECHO/MUGA:  Echo (Left ventricular hypertrophy. Ejection fraction 55-60%. Aortic sclerosis.) - 10/3/2012   Echo (EF 0.60 (60%), Hypertensive heart disease.  Left ventricular hypertrophy.  Aortic sclerosis.  Decreased left ventricular compliance.) - 10/21/2015   STRESS TESTS:  Prasad MPI (Normal nuclear perfusion stress test. Images reveal normal wall motion. Ejection fraction 64%.) - 10/5/2012          ECG.  Normal sinus rhythm, left axis deviation and right bundle branch block.  No change compared to previous EKG.     Assessment/Plan    Coronary artery disease involving native coronary artery without angina pectoris  Solis has not had any anginal symptoms.  There has not been any change in his EKG.    His dyspnea may be an anginal equivalent.  I had a long discussion with him as to whether he would want to have this evaluated further and  he says he does not want any further testing.    I can make adjustments in his medication should his symptoms start to progress.    My first step would be to decrease his losartan to 25 mg daily and put him back on metoprolol 25 daily if needed.    Both Solis and his son will monitor for any change in his symptomatology.    Benign essential hypertension  Blood pressure is relatively well controlled.  Dimitris will continue on the losartan 50 mg daily.    I counseled him on a low-salt diet.    Mixed hyperlipidemia  Dimitris will continue on the atorvastatin 40 mg daily.    I encouraged him to continue his exercise program.      Dimitris will be coming to you for his day-to-day care.  I will continue to keep you informed of his progress.          Thank you for allowing me to participate in the care of this patient.  If you have any questions please don't hesitate to contact me.     Dc Renner MD [facility]   6/5/2019  9:33 AM

## 2019-06-05 NOTE — ASSESSMENT & PLAN NOTE
Dimitris will continue on the atorvastatin 40 mg daily.    I encouraged him to continue his exercise program.

## 2019-06-05 NOTE — ASSESSMENT & PLAN NOTE
Blood pressure is relatively well controlled.  Dimitris will continue on the losartan 50 mg daily.    I counseled him on a low-salt diet.

## 2019-06-05 NOTE — ASSESSMENT & PLAN NOTE
Solis has not had any anginal symptoms.  There has not been any change in his EKG.    His dyspnea may be an anginal equivalent.  I had a long discussion with him as to whether he would want to have this evaluated further and he says he does not want any further testing.    I can make adjustments in his medication should his symptoms start to progress.    My first step would be to decrease his losartan to 25 mg daily and put him back on metoprolol 25 daily if needed.    Both Solis and his son will monitor for any change in his symptomatology.

## 2019-06-05 NOTE — LETTER
June 5, 2019     Alexis Adams MD  [address] [facility] [id number]    Patient: Solis Dolan   YOB: 1925   Date of Visit: 6/5/2019       Dear Dr. Adams:    Thank you for referring Solis Dolan to me for evaluation. Below are my notes for this consultation.    If you have questions, please do not hesitate to call me. I look forward to following your patient along with you.         Sincerely,        Dc Renner MD        CC: No Recipients  Dc Renner MD  6/5/2019  9:34 AM  Signed     Cardiology Note       Reason for visit: Coronary artery disease.   Solis returns today for follow-up accompanied by his son.  He tells me that overall he has been doing well.  He continues to go to the  and do water aerobics.    He does tell me that he feels that he gets slightly more dyspneic with moderate effort such as 125 feet of walking that he did compared to 6 months ago.  He has not had any rest or nocturnal symptoms.  He has not had any chest discomfort.  There has not been any PND orthopnea.  He does get some lower extremity edema which has been chronic and he takes his Lasix on a regular basis.               Past Medical History:   Diagnosis Date   • CAD (coronary artery disease)    • Cellulitis 10/2017    LLE   • Diabetes (CMS/[facility]) ([facility])    • Hypertension    • Lipid disorder    • Peripheral neuropathy    • PVD (peripheral vascular disease) (CMS/[facility]) ([facility])      Past Surgical History:   Procedure Laterality Date   • CORONARY ANGIOPLASTY  1996    PTCA of LAD     No known allergies  Current Outpatient Prescriptions   Medication Sig Dispense Refill   • ascorbic acid (ascorbic acid with justino hips) 500 mg tablet Take 500 mg by mouth daily.     • aspirin (ASPIRIN LOW DOSE) 81 mg enteric coated tablet Take 81 mg by mouth daily.     • atorvastatin (LIPITOR) 40 mg tablet Take 40 mg by mouth daily.     • cholecalciferol, vitamin D3, (VITAMIN D3 ORAL) Take by mouth.     • FREESTYLE LITE STRIPS strip  2 (two) times a day. USE 1 BY MISC.(NON-DRUG COMBO ROUTE) ROUTE 2 TIMES EVERY DAY  2   • gabapentin (NEURONTIN) 300 mg capsule Take 1 capsule by mouth 2 (two) times a day.       • glimepiride (AMARYL) 4 mg tablet Take 4 mg by mouth 2 (two) times a day.     • glucosamine sulfate (GLUCOSAMINE) 500 mg tablet Take 500 mg by mouth daily.     • latanoprost (XALATAN) 0.005 % ophthalmic solution Administer 1 drop into affected eye(s) daily.     • losartan (COZAAR) 50 mg tablet Take 50 mg by mouth daily.     • metFORMIN XR (GLUCOPHAGE-XR) 750 mg 24 hr tablet Take 750 mg by mouth every evening. With evening meal     • multivitamin (THERAGRAN) tablet Take 1 tablet by mouth daily. With food     • omega 3-dha-epa-fish oil 1,000 mg (120 mg-180 mg) capsule Take 2-3 tablets by mouth daily.     • vitamin E 400 unit capsule Take 1 tablet by mouth daily.       No current facility-administered medications for this visit.      Social History     Social History   • Marital status:      Spouse name: N/A   • Number of children: N/A   • Years of education: N/A     Social History Main Topics   • Smoking status: Never Smoker   • Smokeless tobacco: Never Used   • Alcohol use None   • Drug use: Unknown   • Sexual activity: Not Asked     Other Topics Concern   • None     Social History Narrative   • None     Family History   Problem Relation Age of Onset   • Heart attack Neg Hx          Review of Systems   Constitution: Negative for weakness.   HENT: Negative for nosebleeds.    Eyes: Negative for visual disturbance.   Cardiovascular: Positive for dyspnea on exertion. Negative for chest pain, claudication, irregular heartbeat, leg swelling, near-syncope, orthopnea, palpitations, paroxysmal nocturnal dyspnea and syncope.   Respiratory: Negative for cough and shortness of breath.    Hematologic/Lymphatic: Does not bruise/bleed easily.   Skin: Negative for rash.   Musculoskeletal: Negative for back pain, joint pain and myalgias.    Gastrointestinal: Negative for abdominal pain, diarrhea, nausea and vomiting.   Genitourinary: Negative for hematuria.   Neurological: Negative for dizziness.   Psychiatric/Behavioral: Negative for altered mental status.   Allergic/Immunologic: Negative for hives.      Objective    Vitals:    06/05/19 0857   BP: 130/64   Pulse: 75   Resp: 18      Wt Readings from Last 3 Encounters:   06/05/19 90.7 kg (200 lb)   12/03/18 93.4 kg (206 lb)   05/21/18 94.4 kg (208 lb 3.2 oz)      Physical Exam   Constitutional: He is oriented to person, place, and time. He appears well-developed and well-nourished. He is cooperative.   HENT:   Head: Normocephalic and atraumatic.   Eyes: No scleral icterus.   Neck: No JVD present. Carotid bruit is not present.   Cardiovascular: Normal rate, regular rhythm, S1 normal and S2 normal.  Exam reveals gallop and S4. Exam reveals no friction rub.    No murmur heard.  Pulses:       Carotid pulses are 2+ on the right side, and 2+ on the left side.       Dorsalis pedis pulses are 2+ on the right side, and 2+ on the left side.        Posterior tibial pulses are 2+ on the right side, and 2+ on the left side.   Pulmonary/Chest: Effort normal and breath sounds normal.   Abdominal: Soft. Bowel sounds are normal. There is no tenderness.   Musculoskeletal: Normal range of motion. He exhibits edema.   Trace edema of the lower extremities bilaterally   Neurological: He is alert and oriented to person, place, and time.   Skin: Skin is warm and dry.   Psychiatric: He has a normal mood and affect. His behavior is normal.               Cardiovascular Procedures:  CATH LAB:  PCI (PTCA LAD) - 1996   ECHO/MUGA:  Echo (Left ventricular hypertrophy. Ejection fraction 55-60%. Aortic sclerosis.) - 10/3/2012   Echo (EF 0.60 (60%), Hypertensive heart disease.  Left ventricular hypertrophy.  Aortic sclerosis.  Decreased left ventricular compliance.) - 10/21/2015   STRESS TESTS:  Prasad MPI (Normal nuclear perfusion  stress test. Images reveal normal wall motion. Ejection fraction 64%.) - 10/5/2012          ECG.  Normal sinus rhythm, left axis deviation and right bundle branch block.  No change compared to previous EKG.     Assessment/Plan    Coronary artery disease involving native coronary artery without angina pectoris  Solis has not had any anginal symptoms.  There has not been any change in his EKG.    His dyspnea may be an anginal equivalent.  I had a long discussion with him as to whether he would want to have this evaluated further and he says he does not want any further testing.    I can make adjustments in his medication should his symptoms start to progress.    My first step would be to decrease his losartan to 25 mg daily and put him back on metoprolol 25 daily if needed.    Both Solis and his son will monitor for any change in his symptomatology.    Benign essential hypertension  Blood pressure is relatively well controlled.  Dimitris will continue on the losartan 50 mg daily.    I counseled him on a low-salt diet.    Mixed hyperlipidemia  Dimitris will continue on the atorvastatin 40 mg daily.    I encouraged him to continue his exercise program.      Dimitris will be coming to you for his day-to-day care.  I will continue to keep you informed of his progress.          Thank you for allowing me to participate in the care of this patient.  If you have any questions please don't hesitate to contact me.     Dc Renner MD [facility]   6/5/2019  9:33 AM

## 2019-11-21 ENCOUNTER — OFFICE VISIT (OUTPATIENT)
Dept: CARDIOLOGY | Facility: CLINIC | Age: 84
End: 2019-11-21
Payer: COMMERCIAL

## 2019-11-21 VITALS
DIASTOLIC BLOOD PRESSURE: 68 MMHG | BODY MASS INDEX: 32.27 KG/M2 | RESPIRATION RATE: 16 BRPM | HEIGHT: 66 IN | WEIGHT: 200.8 LBS | SYSTOLIC BLOOD PRESSURE: 132 MMHG | HEART RATE: 88 BPM

## 2019-11-21 DIAGNOSIS — I25.10 CORONARY ARTERY DISEASE INVOLVING NATIVE CORONARY ARTERY OF NATIVE HEART WITHOUT ANGINA PECTORIS: ICD-10-CM

## 2019-11-21 DIAGNOSIS — I10 BENIGN ESSENTIAL HYPERTENSION: ICD-10-CM

## 2019-11-21 DIAGNOSIS — I10 ESSENTIAL HYPERTENSION: Primary | ICD-10-CM

## 2019-11-21 DIAGNOSIS — E78.2 MIXED HYPERLIPIDEMIA: ICD-10-CM

## 2019-11-21 PROCEDURE — 99214 OFFICE O/P EST MOD 30 MIN: CPT | Performed by: INTERNAL MEDICINE

## 2019-11-21 PROCEDURE — 93000 ELECTROCARDIOGRAM COMPLETE: CPT | Performed by: INTERNAL MEDICINE

## 2019-11-21 ASSESSMENT — ENCOUNTER SYMPTOMS
SYNCOPE: 0
NAUSEA: 0
ALTERED MENTAL STATUS: 0
VOMITING: 0
SHORTNESS OF BREATH: 0
DIZZINESS: 0
ABDOMINAL PAIN: 0
BACK PAIN: 0
PND: 0
NEAR-SYNCOPE: 0
IRREGULAR HEARTBEAT: 0
CLAUDICATION: 0
DYSPNEA ON EXERTION: 0
WEAKNESS: 0
DIARRHEA: 0
PALPITATIONS: 0
ORTHOPNEA: 0
BRUISES/BLEEDS EASILY: 0
MYALGIAS: 0
COUGH: 1
HEMATURIA: 0

## 2019-11-21 NOTE — PROGRESS NOTES
Cardiology Note       Reason for visit: Coronary artery disease   Dimitris returns today for follow-up accompanied by his son.  He had spent several days in the hospital at [place] because of the right lower extremity cellulitis which is now resolved.    He does have a chronic cough which is slowly improving.  He has not had any chest discomfort, shortness of breath, palpitations or dizziness.    There has not been any neurologic or TIA-like symptoms.           Past Medical History:   Diagnosis Date   • CAD (coronary artery disease)    • Cellulitis 10/2017    LLE   • Diabetes (CMS/[facility])    • Hypertension    • Lipid disorder    • Peripheral neuropathy    • PVD (peripheral vascular disease) (CMS/[facility])      Past Surgical History:   Procedure Laterality Date   • CORONARY ANGIOPLASTY  1996    PTCA of LAD     No known allergies  Current Outpatient Medications   Medication Sig Dispense Refill   • ascorbic acid (ascorbic acid with justino hips) 500 mg tablet Take 500 mg by mouth daily.     • aspirin (ASPIRIN LOW DOSE) 81 mg enteric coated tablet Take 81 mg by mouth daily.     • atorvastatin (LIPITOR) 40 mg tablet Take 40 mg by mouth daily.     • cholecalciferol, vitamin D3, (VITAMIN D3 ORAL) Take by mouth.     • FREESTYLE LITE STRIPS strip 2 (two) times a day. USE 1 BY MISC.(NON-DRUG COMBO ROUTE) ROUTE 2 TIMES EVERY DAY  2   • gabapentin (NEURONTIN) 300 mg capsule Take 1 capsule by mouth 2 (two) times a day.       • glimepiride (AMARYL) 4 mg tablet Take 4 mg by mouth 2 (two) times a day.     • glucosamine sulfate (GLUCOSAMINE) 500 mg tablet Take 500 mg by mouth daily.     • latanoprost (XALATAN) 0.005 % ophthalmic solution Administer 1 drop into affected eye(s) daily.     • losartan (COZAAR) 50 mg tablet Take 50 mg by mouth daily.     • metFORMIN XR (GLUCOPHAGE-XR) 750 mg 24 hr tablet Take 750 mg by mouth every evening. With evening meal     • multivitamin (THERAGRAN) tablet Take 1 tablet by mouth daily. With food     •  omega 3-dha-epa-fish oil 1,000 mg (120 mg-180 mg) capsule Take 2-3 tablets by mouth daily.     • vitamin E 400 unit capsule Take 1 tablet by mouth daily.       No current facility-administered medications for this visit.      Social History     Socioeconomic History   • Marital status:      Spouse name: None   • Number of children: None   • Years of education: None   • Highest education level: None   Occupational History   • None   Social Needs   • Financial resource strain: None   • Food insecurity:     Worry: None     Inability: None   • Transportation needs:     Medical: None     Non-medical: None   Tobacco Use   • Smoking status: Never Smoker   • Smokeless tobacco: Never Used   Substance and Sexual Activity   • Alcohol use: None   • Drug use: None   • Sexual activity: None   Lifestyle   • Physical activity:     Days per week: None     Minutes per session: None   • Stress: None   Relationships   • Social connections:     Talks on phone: None     Gets together: None     Attends Anabaptism service: None     Active member of club or organization: None     Attends meetings of clubs or organizations: None     Relationship status: None   • Intimate partner violence:     Fear of current or ex partner: None     Emotionally abused: None     Physically abused: None     Forced sexual activity: None   Other Topics Concern   • None   Social History Narrative   • None     Family History   Problem Relation Age of Onset   • Heart attack Neg Hx          Review of Systems   HENT: Negative for nosebleeds.    Eyes: Negative for visual disturbance.   Cardiovascular: Negative for chest pain, claudication, dyspnea on exertion, irregular heartbeat, leg swelling, near-syncope, orthopnea, palpitations, paroxysmal nocturnal dyspnea and syncope.   Respiratory: Positive for cough. Negative for shortness of breath.    Hematologic/Lymphatic: Does not bruise/bleed easily.   Skin: Negative for rash.   Musculoskeletal: Positive for joint  pain. Negative for back pain and myalgias.   Gastrointestinal: Negative for abdominal pain, diarrhea, nausea and vomiting.   Genitourinary: Negative for hematuria.   Neurological: Negative for dizziness and weakness.   Psychiatric/Behavioral: Negative for altered mental status.   Allergic/Immunologic: Negative for hives.      Objective    Vitals:    11/21/19 0911   BP: 132/68   Pulse: 88   Resp: 16      Wt Readings from Last 3 Encounters:   11/21/19 91.1 kg (200 lb 12.8 oz)   06/05/19 90.7 kg (200 lb)   12/03/18 93.4 kg (206 lb)      Physical Exam   Constitutional: He is oriented to person, place, and time. He appears well-developed and well-nourished.   HENT:   Head: Normocephalic and atraumatic.   Eyes: No scleral icterus.   Neck: No JVD present. Carotid bruit is not present.   Cardiovascular: Normal rate, regular rhythm, S1 normal and S2 normal. Exam reveals gallop and S4. Exam reveals no friction rub.   No murmur heard.  Pulses:       Carotid pulses are 2+ on the right side, and 2+ on the left side.       Dorsalis pedis pulses are 1+ on the right side, and 1+ on the left side.        Posterior tibial pulses are 0 on the right side, and 1+ on the left side.   Pulmonary/Chest: Effort normal and breath sounds normal.   Abdominal: Soft. Bowel sounds are normal. There is no tenderness.   Musculoskeletal: He exhibits edema.   Trace edema of the RLE   Neurological: He is alert and oriented to person, place, and time.   Skin: Skin is warm and dry.   Psychiatric: He has a normal mood and affect. His behavior is normal.                 Cardiovascular Procedures:  CATH LAB:  PCI (PTCA LAD) - 1996   ECHO/MUGA:  Echo (Left ventricular hypertrophy. Ejection fraction 55-60%. Aortic sclerosis.) - 10/3/2012   Echo (EF 0.60 (60%), Hypertensive heart disease.  Left ventricular hypertrophy.  Aortic sclerosis.  Decreased left ventricular compliance.) - 10/21/2015   STRESS TESTS:  Prasad MPI (Normal nuclear perfusion stress test.  Images reveal normal wall motion. Ejection fraction 64%.) - 10/5/2012        ECG.  Normal sinus  rhythm, right bundle branch block.  No change compared to previous EKG.   Assessment/Plan    Benign essential hypertension  Blood pressure is controlled.  Dimitris will continue on the losartan 50 mg daily.    I counseled him on a low-salt diet.    Coronary artery disease involving native coronary artery without angina pectoris  No anginal symptoms.  His EKG is unchanged.  He will continue on the aspirin and statin therapy.    Mixed hyperlipidemia  Dimitris will continue on atorvastatin 40 mg daily.  If you could forward a copy of his lipid profile when he has a performed I would greatly appreciate it.      Solis will be coming to you for his day-to-day care.  I will continue to keep you informed of his progress.          Thank you for allowing me to participate in the care of this patient.  If you have any questions please don't hesitate to contact me.     Dc Renner MD [facility]   11/21/2019  9:45 AM

## 2019-11-21 NOTE — LETTER
November 21, 2019     Alexis Adams MD  [address] [facility] [address]    Patient: Solis Dolan  YOB: 1925  Date of Visit: 11/21/2019      Dear Dr. Adams:    Thank you for referring Solis Dolan to me for evaluation. Below are my notes for this consultation.    If you have questions, please do not hesitate to call me. I look forward to following your patient along with you.         Sincerely,        Dc Renner MD        CC: No Recipients  Dc Renner MD  11/21/2019  9:46 AM  Signed     Cardiology Note       Reason for visit: Coronary artery disease   Dimitris returns today for follow-up accompanied by his son.  He had spent several days in the hospital at [place] because of the right lower extremity cellulitis which is now resolved.    He does have a chronic cough which is slowly improving.  He has not had any chest discomfort, shortness of breath, palpitations or dizziness.    There has not been any neurologic or TIA-like symptoms.           Past Medical History:   Diagnosis Date   • CAD (coronary artery disease)    • Cellulitis 10/2017    LLE   • Diabetes (CMS/[facility])    • Hypertension    • Lipid disorder    • Peripheral neuropathy    • PVD (peripheral vascular disease) (CMS/[facility])      Past Surgical History:   Procedure Laterality Date   • CORONARY ANGIOPLASTY  1996    PTCA of LAD     No known allergies  Current Outpatient Medications   Medication Sig Dispense Refill   • ascorbic acid (ascorbic acid with justino hips) 500 mg tablet Take 500 mg by mouth daily.     • aspirin (ASPIRIN LOW DOSE) 81 mg enteric coated tablet Take 81 mg by mouth daily.     • atorvastatin (LIPITOR) 40 mg tablet Take 40 mg by mouth daily.     • cholecalciferol, vitamin D3, (VITAMIN D3 ORAL) Take by mouth.     • FREESTYLE LITE STRIPS strip 2 (two) times a day. USE 1 BY MISC.(NON-DRUG COMBO ROUTE) ROUTE 2 TIMES EVERY DAY  2   • gabapentin (NEURONTIN) 300 mg capsule Take 1 capsule by mouth 2 (two) times a  day.       • glimepiride (AMARYL) 4 mg tablet Take 4 mg by mouth 2 (two) times a day.     • glucosamine sulfate (GLUCOSAMINE) 500 mg tablet Take 500 mg by mouth daily.     • latanoprost (XALATAN) 0.005 % ophthalmic solution Administer 1 drop into affected eye(s) daily.     • losartan (COZAAR) 50 mg tablet Take 50 mg by mouth daily.     • metFORMIN XR (GLUCOPHAGE-XR) 750 mg 24 hr tablet Take 750 mg by mouth every evening. With evening meal     • multivitamin (THERAGRAN) tablet Take 1 tablet by mouth daily. With food     • omega 3-dha-epa-fish oil 1,000 mg (120 mg-180 mg) capsule Take 2-3 tablets by mouth daily.     • vitamin E 400 unit capsule Take 1 tablet by mouth daily.       No current facility-administered medications for this visit.      Social History     Socioeconomic History   • Marital status:      Spouse name: None   • Number of children: None   • Years of education: None   • Highest education level: None   Occupational History   • None   Social Needs   • Financial resource strain: None   • Food insecurity:     Worry: None     Inability: None   • Transportation needs:     Medical: None     Non-medical: None   Tobacco Use   • Smoking status: Never Smoker   • Smokeless tobacco: Never Used   Substance and Sexual Activity   • Alcohol use: None   • Drug use: None   • Sexual activity: None   Lifestyle   • Physical activity:     Days per week: None     Minutes per session: None   • Stress: None   Relationships   • Social connections:     Talks on phone: None     Gets together: None     Attends Orthodox service: None     Active member of club or organization: None     Attends meetings of clubs or organizations: None     Relationship status: None   • Intimate partner violence:     Fear of current or ex partner: None     Emotionally abused: None     Physically abused: None     Forced sexual activity: None   Other Topics Concern   • None   Social History Narrative   • None     Family History   Problem  Relation Age of Onset   • Heart attack Neg Hx          Review of Systems   HENT: Negative for nosebleeds.    Eyes: Negative for visual disturbance.   Cardiovascular: Negative for chest pain, claudication, dyspnea on exertion, irregular heartbeat, leg swelling, near-syncope, orthopnea, palpitations, paroxysmal nocturnal dyspnea and syncope.   Respiratory: Positive for cough. Negative for shortness of breath.    Hematologic/Lymphatic: Does not bruise/bleed easily.   Skin: Negative for rash.   Musculoskeletal: Positive for joint pain. Negative for back pain and myalgias.   Gastrointestinal: Negative for abdominal pain, diarrhea, nausea and vomiting.   Genitourinary: Negative for hematuria.   Neurological: Negative for dizziness and weakness.   Psychiatric/Behavioral: Negative for altered mental status.   Allergic/Immunologic: Negative for hives.      Objective    Vitals:    11/21/19 0911   BP: 132/68   Pulse: 88   Resp: 16      Wt Readings from Last 3 Encounters:   11/21/19 91.1 kg (200 lb 12.8 oz)   06/05/19 90.7 kg (200 lb)   12/03/18 93.4 kg (206 lb)      Physical Exam   Constitutional: He is oriented to person, place, and time. He appears well-developed and well-nourished.   HENT:   Head: Normocephalic and atraumatic.   Eyes: No scleral icterus.   Neck: No JVD present. Carotid bruit is not present.   Cardiovascular: Normal rate, regular rhythm, S1 normal and S2 normal. Exam reveals gallop and S4. Exam reveals no friction rub.   No murmur heard.  Pulses:       Carotid pulses are 2+ on the right side, and 2+ on the left side.       Dorsalis pedis pulses are 1+ on the right side, and 1+ on the left side.        Posterior tibial pulses are 0 on the right side, and 1+ on the left side.   Pulmonary/Chest: Effort normal and breath sounds normal.   Abdominal: Soft. Bowel sounds are normal. There is no tenderness.   Musculoskeletal: He exhibits edema.   Trace edema of the RLE   Neurological: He is alert and oriented to  person, place, and time.   Skin: Skin is warm and dry.   Psychiatric: He has a normal mood and affect. His behavior is normal.                 Cardiovascular Procedures:  CATH LAB:  PCI (PTCA LAD) - 1996   ECHO/MUGA:  Echo (Left ventricular hypertrophy. Ejection fraction 55-60%. Aortic sclerosis.) - 10/3/2012   Echo (EF 0.60 (60%), Hypertensive heart disease.  Left ventricular hypertrophy.  Aortic sclerosis.  Decreased left ventricular compliance.) - 10/21/2015   STRESS TESTS:  Prasad MPI (Normal nuclear perfusion stress test. Images reveal normal wall motion. Ejection fraction 64%.) - 10/5/2012        ECG.  Normal sinus  rhythm, right bundle branch block.  No change compared to previous EKG.   Assessment/Plan    Benign essential hypertension  Blood pressure is controlled.  Dimitris will continue on the losartan 50 mg daily.    I counseled him on a low-salt diet.    Coronary artery disease involving native coronary artery without angina pectoris  No anginal symptoms.  His EKG is unchanged.  He will continue on the aspirin and statin therapy.    Mixed hyperlipidemia  Dimitris will continue on atorvastatin 40 mg daily.  If you could forward a copy of his lipid profile when he has a performed I would greatly appreciate it.      Solis will be coming to you for his day-to-day care.  I will continue to keep you informed of his progress.          Thank you for allowing me to participate in the care of this patient.  If you have any questions please don't hesitate to contact me.     Dc Renner MD [facility]   11/21/2019  9:45 AM

## 2019-11-21 NOTE — ASSESSMENT & PLAN NOTE
Blood pressure is controlled.  Dimitris will continue on the losartan 50 mg daily.    I counseled him on a low-salt diet.

## 2019-11-21 NOTE — ASSESSMENT & PLAN NOTE
Dimitris will continue on atorvastatin 40 mg daily.  If you could forward a copy of his lipid profile when he has a performed I would greatly appreciate it.

## 2020-05-14 ENCOUNTER — TELEPHONE (OUTPATIENT)
Dept: SCHEDULING | Facility: CLINIC | Age: 85
End: 2020-05-14

## 2020-05-14 NOTE — TELEPHONE ENCOUNTER
Caller: Solis Owen Jr, Son  Phone: [phone]  Re: 6/29 OV    Caller canceled 6/29 OV due to COVID. He declined telemed and will call back to rs. Reachable above.

## 2020-09-21 ENCOUNTER — APPOINTMENT (RX ONLY)
Dept: URBAN - METROPOLITAN AREA CLINIC 28 | Facility: CLINIC | Age: 86
Setting detail: DERMATOLOGY
End: 2020-09-21

## 2020-09-21 DIAGNOSIS — L40.4 GUTTATE PSORIASIS: ICD-10-CM

## 2020-09-21 PROCEDURE — ? COUNSELING

## 2020-09-21 PROCEDURE — ? PRESCRIPTION MEDICATION MANAGEMENT

## 2020-09-21 PROCEDURE — ? PRESCRIPTION

## 2020-09-21 PROCEDURE — 99202 OFFICE O/P NEW SF 15 MIN: CPT

## 2020-09-21 PROCEDURE — ? PHOTO-DOCUMENTATION

## 2020-09-21 RX ORDER — AMOXICILLIN 500 MG/1
TABLET, FILM COATED ORAL
Qty: 14 | Refills: 0 | Status: ERX | COMMUNITY
Start: 2020-09-21

## 2020-09-21 RX ORDER — AMMONIUM LACTATE 12 G/100G
LOTION TOPICAL QDAY
Qty: 1 | Refills: 3 | Status: ERX | COMMUNITY
Start: 2020-09-21

## 2020-09-21 RX ORDER — KETOCONAZOLE 20 MG/G
CREAM TOPICAL QDAY
Qty: 1 | Refills: 0 | Status: ERX | COMMUNITY
Start: 2020-09-21

## 2020-09-21 RX ORDER — TRIAMCINOLONE ACETONIDE 1 MG/G
CREAM TOPICAL BID
Qty: 1 | Refills: 3 | Status: ERX | COMMUNITY
Start: 2020-09-21

## 2020-09-21 RX ADMIN — AMOXICILLIN: 500 TABLET, FILM COATED ORAL at 00:00

## 2020-09-21 RX ADMIN — TRIAMCINOLONE ACETONIDE: 1 CREAM TOPICAL at 00:00

## 2020-09-21 RX ADMIN — KETOCONAZOLE: 20 CREAM TOPICAL at 00:00

## 2020-09-21 RX ADMIN — AMMONIUM LACTATE: 12 LOTION TOPICAL at 00:00

## 2020-09-21 ASSESSMENT — LOCATION SIMPLE DESCRIPTION DERM
LOCATION SIMPLE: RIGHT PRETIBIAL REGION
LOCATION SIMPLE: RIGHT FOREARM
LOCATION SIMPLE: LEFT ELBOW
LOCATION SIMPLE: LEFT POPLITEAL SKIN

## 2020-09-21 ASSESSMENT — LOCATION DETAILED DESCRIPTION DERM
LOCATION DETAILED: RIGHT MEDIAL DISTAL PRETIBIAL REGION
LOCATION DETAILED: RIGHT DISTAL PRETIBIAL REGION
LOCATION DETAILED: RIGHT PROXIMAL ULNAR DORSAL FOREARM
LOCATION DETAILED: RIGHT PROXIMAL PRETIBIAL REGION
LOCATION DETAILED: LEFT MEDIAL POPLITEAL SKIN
LOCATION DETAILED: RIGHT DISTAL DORSAL FOREARM
LOCATION DETAILED: LEFT ELBOW

## 2020-09-21 ASSESSMENT — LOCATION ZONE DERM
LOCATION ZONE: LEG
LOCATION ZONE: ARM

## 2020-09-21 NOTE — PROCEDURE: PRESCRIPTION MEDICATION MANAGEMENT
Render In Strict Bullet Format?: No
Detail Level: Zone
Initiate Treatment: amoxicillin 500mg tablet: Take one tablet po BID with food x 7 days\\ntriamcinolone acetonide 0.1% topical cream: Apply bid to the psoriasis of body (not face)\\nammonium lactate 12% lotion: Apply a thin layer to the body of the affected areas\\nketoconazole 2% topical cream: Apply a thin layer to feet and toenails QDAY

## 2020-10-02 ENCOUNTER — OFFICE VISIT (OUTPATIENT)
Dept: CARDIOLOGY | Facility: CLINIC | Age: 85
End: 2020-10-02
Payer: COMMERCIAL

## 2020-10-02 VITALS — RESPIRATION RATE: 14 BRPM | DIASTOLIC BLOOD PRESSURE: 60 MMHG | SYSTOLIC BLOOD PRESSURE: 136 MMHG | HEART RATE: 72 BPM

## 2020-10-02 DIAGNOSIS — I10 BENIGN ESSENTIAL HYPERTENSION: Primary | ICD-10-CM

## 2020-10-02 DIAGNOSIS — I25.10 CORONARY ARTERY DISEASE INVOLVING NATIVE CORONARY ARTERY OF NATIVE HEART WITHOUT ANGINA PECTORIS: ICD-10-CM

## 2020-10-02 DIAGNOSIS — E78.2 MIXED HYPERLIPIDEMIA: ICD-10-CM

## 2020-10-02 PROCEDURE — 99214 OFFICE O/P EST MOD 30 MIN: CPT | Performed by: INTERNAL MEDICINE

## 2020-10-02 PROCEDURE — 93000 ELECTROCARDIOGRAM COMPLETE: CPT | Performed by: INTERNAL MEDICINE

## 2020-10-02 RX ORDER — TRIAMCINOLONE ACETONIDE 1 MG/G
CREAM TOPICAL 2 TIMES DAILY
COMMUNITY
Start: 2020-09-21

## 2020-10-02 RX ORDER — AMMONIUM LACTATE 12 G/100G
LOTION TOPICAL
COMMUNITY
Start: 2020-09-21

## 2020-10-02 RX ORDER — KETOCONAZOLE 20 MG/G
CREAM TOPICAL
COMMUNITY
Start: 2020-09-21

## 2020-10-02 RX ORDER — VALSARTAN 80 MG/1
80 TABLET ORAL
COMMUNITY
Start: 2020-07-07

## 2020-10-02 RX ORDER — FUROSEMIDE 20 MG/1
20 TABLET ORAL
COMMUNITY
Start: 2020-07-04

## 2020-10-02 ASSESSMENT — ENCOUNTER SYMPTOMS
SYNCOPE: 0
ORTHOPNEA: 0
DIZZINESS: 0
CLAUDICATION: 0
ALTERED MENTAL STATUS: 0
DYSPNEA ON EXERTION: 0
ABDOMINAL PAIN: 0
IRREGULAR HEARTBEAT: 0
WEAKNESS: 0
NUMBNESS: 1
DIARRHEA: 0
MYALGIAS: 0
PALPITATIONS: 0
BRUISES/BLEEDS EASILY: 0
NAUSEA: 0
HEMATURIA: 0
BACK PAIN: 0
NEAR-SYNCOPE: 0
VOMITING: 0
COUGH: 0
PND: 0
SHORTNESS OF BREATH: 0

## 2020-10-02 NOTE — ASSESSMENT & PLAN NOTE
Blood pressure is controlled.  He will continue on the valsartan 80 mg daily.    I feel that Dimitris has mild volume overload.  He is currently taking Lasix 20 mg twice a day.  I have increased him to 40 mg twice a day for the next 3 days and then back to 20 mg twice daily.    His son will monitor his lower extremities as well as his weight.  I told him if he starts to see that he persistently has increasing edema and he may need a higher baseline dose of diuretics such as Lasix 40 mg twice daily.    I have counseled Dimitris on a low-salt diet.

## 2020-10-02 NOTE — ASSESSMENT & PLAN NOTE
Dimitris will continue on atorvastatin 40 mg daily along with the fish oil.  His LDL last year was 56.

## 2020-10-02 NOTE — LETTER
October 2, 2020     Alexis Adams MD  [address] [facility] [address]    Patient: Solis Dolan  YOB: 1925  Date of Visit: 10/2/2020      Dear Dr. Adams:    Thank you for referring Solis Dolan to me for evaluation. Below are my notes for this consultation.    If you have questions, please do not hesitate to call me. I look forward to following your patient along with you.         Sincerely,        Dc Renner MD        CC: No Recipients  Dc Renner MD  10/2/2020 11:36 AM  Signed     Cardiology Note       Reason for visit: Coronary artery disease      Solis returns today for follow-up accompanied by his son.  He has had some increasing lower extremity edema but no PND orthopnea.  His dyspnea on exertion has been unchanged.    His main complaint is that of his joint pain as well as his peripheral neuropathy.  There has not been any TIA-like symptoms.  He has not any slurred speech, unilateral weakness or visual loss.    Dimitris is able to do his activities of daily living without any difficulty.  There has not been any recent hospitalizations or emergency room visits.        Past Medical History:   Diagnosis Date   • CAD (coronary artery disease)    • Cellulitis 10/2017    LLE   • Diabetes (CMS/[facility])    • Hypertension    • Lipid disorder    • Peripheral neuropathy    • PVD (peripheral vascular disease) (CMS/[facility])      Past Surgical History:   Procedure Laterality Date   • CORONARY ANGIOPLASTY  1996    PTCA of LAD     No known allergies  Current Outpatient Medications   Medication Sig Dispense Refill   • ammonium lactate (LAC-HYDRIN) 12 % lotion APPLY A THIN LAYER TO THE BODY OF THE AFFECTED AREAS     • ascorbic acid (ascorbic acid with justino hips) 500 mg tablet Take 500 mg by mouth daily.     • aspirin (ASPIRIN LOW DOSE) 81 mg enteric coated tablet Take 81 mg by mouth daily.     • atorvastatin (LIPITOR) 40 mg tablet Take 40 mg by mouth daily.     • cholecalciferol, vitamin D3, (VITAMIN  D3 ORAL) Take 1 tablet by mouth daily.       • FREESTYLE LITE STRIPS strip 2 (two) times a day. USE 1 BY MISC.(NON-DRUG COMBO ROUTE) ROUTE 2 TIMES EVERY DAY  2   • furosemide (LASIX) 20 mg tablet Take 20 mg by mouth 2 (two) times a day.     • gabapentin (NEURONTIN) 300 mg capsule Take 1 capsule by mouth 2 (two) times a day.       • glimepiride (AMARYL) 4 mg tablet Take 4 mg by mouth 2 (two) times a day.     • glucosamine sulfate (GLUCOSAMINE) 500 mg tablet Take 500 mg by mouth daily.     • ketoconazole (NIZORAL) 2 % cream APPLY A THIN LAYER TO FEET AND TOENAILS EVERY DAY     • latanoprost (XALATAN) 0.005 % ophthalmic solution Administer 1 drop into affected eye(s) daily.     • metFORMIN XR (GLUCOPHAGE-XR) 750 mg 24 hr tablet Take 750 mg by mouth every evening. With evening meal     • multivitamin (THERAGRAN) tablet Take 1 tablet by mouth daily. With food     • omega 3-dha-epa-fish oil 1,000 mg (120 mg-180 mg) capsule Take 2-3 tablets by mouth daily.     • triamcinolone (KENALOG) 0.1 % cream Apply topically 2 (two) times a day. psorasis     • valsartan (DIOVAN) 80 mg tablet Take 80 mg by mouth once daily.     • vitamin E 400 unit capsule Take 1 tablet by mouth daily.       No current facility-administered medications for this visit.      Social History     Socioeconomic History   • Marital status:      Spouse name: None   • Number of children: None   • Years of education: None   • Highest education level: None   Occupational History   • None   Social Needs   • Financial resource strain: None   • Food insecurity     Worry: None     Inability: None   • Transportation needs     Medical: None     Non-medical: None   Tobacco Use   • Smoking status: Never Smoker   • Smokeless tobacco: Never Used   Substance and Sexual Activity   • Alcohol use: None   • Drug use: None   • Sexual activity: None   Lifestyle   • Physical activity     Days per week: None     Minutes per session: None   • Stress: None   Relationships   •  Social connections     Talks on phone: None     Gets together: None     Attends Quaker service: None     Active member of club or organization: None     Attends meetings of clubs or organizations: None     Relationship status: None   • Intimate partner violence     Fear of current or ex partner: None     Emotionally abused: None     Physically abused: None     Forced sexual activity: None   Other Topics Concern   • None   Social History Narrative   • None     Family History   Problem Relation Age of Onset   • Heart attack Neg Hx          Review of Systems   HENT: Negative for nosebleeds.    Eyes: Negative for visual disturbance.   Cardiovascular: Positive for leg swelling. Negative for chest pain, claudication, dyspnea on exertion, irregular heartbeat, near-syncope, orthopnea, palpitations, paroxysmal nocturnal dyspnea and syncope.   Respiratory: Negative for cough and shortness of breath.    Hematologic/Lymphatic: Does not bruise/bleed easily.   Skin: Positive for rash.   Musculoskeletal: Negative for back pain, joint pain and myalgias.   Gastrointestinal: Negative for abdominal pain, diarrhea, nausea and vomiting.   Genitourinary: Negative for hematuria.   Neurological: Positive for numbness. Negative for dizziness and weakness.        Peripheral edema   Psychiatric/Behavioral: Negative for altered mental status.   Allergic/Immunologic: Negative for hives.      Objective    Vitals:    10/02/20 1027   BP: 136/60   Pulse: 72   Resp: 14      Wt Readings from Last 3 Encounters:   11/21/19 91.1 kg (200 lb 12.8 oz)   06/05/19 90.7 kg (200 lb)   12/03/18 93.4 kg (206 lb)      Physical Exam   Constitutional: He is oriented to person, place, and time. He appears well-developed and well-nourished.   HENT:   Head: Normocephalic and atraumatic.   Eyes: No scleral icterus.   Neck: No JVD present. Carotid bruit is not present.   Cardiovascular: Normal rate, regular rhythm, S1 normal and S2 normal. Exam reveals gallop and S4.  Exam reveals no friction rub.   No murmur heard.  Pulses:       Carotid pulses are 2+ on the right side and 2+ on the left side.       Dorsalis pedis pulses are 2+ on the right side and 2+ on the left side.        Posterior tibial pulses are 2+ on the right side and 2+ on the left side.   Pulmonary/Chest: Effort normal and breath sounds normal.   Abdominal: Soft. Bowel sounds are normal. There is no abdominal tenderness.   Musculoskeletal:         General: Edema present.      Comments: 2 + edema of the lower extremities bilaterally   Neurological: He is alert and oriented to person, place, and time.   Skin: Skin is warm and dry.   Psychiatric: He has a normal mood and affect. His behavior is normal.                 Cardiovascular Procedures:  CATH LAB:  PCI (PTCA LAD) - 1996   ECHO/MUGA:  Echo (Left ventricular hypertrophy. Ejection fraction 55-60%. Aortic sclerosis.) - 10/3/2012   Echo (EF 0.60 (60%), Hypertensive heart disease.  Left ventricular hypertrophy.  Aortic sclerosis.  Decreased left ventricular compliance.) - 10/21/2015   STRESS TESTS:  Prasad MPI (Normal nuclear perfusion stress test. Images reveal normal wall motion. Ejection fraction 64%.) - 10/5/2012        ECG.  Normal sinus rhythm, blocked PAC, first-degree AV block, right bundle branch block and left axis deviation.  Other than blocked PAC no change compared to previous EKG.     Assessment/Plan    Benign essential hypertension  Blood pressure is controlled.  He will continue on the valsartan 80 mg daily.    I feel that Dimitris has mild volume overload.  He is currently taking Lasix 20 mg twice a day.  I have increased him to 40 mg twice a day for the next 3 days and then back to 20 mg twice daily.    His son will monitor his lower extremities as well as his weight.  I told him if he starts to see that he persistently has increasing edema and he may need a higher baseline dose of diuretics such as Lasix 40 mg twice daily.    I have counseled Dimitris on a  low-salt diet.    Coronary artery disease involving native coronary artery without angina pectoris  No anginal symptoms.  Dimitris will continue on the aspirin, and statin therapy.  His EKG is unchanged.    Mixed hyperlipidemia  Dimitris will continue on atorvastatin 40 mg daily along with the fish oil.  His LDL last year was 56.    Solis will be coming due for his day-to-day care.  Of asked him to follow-up with me in 6 months time or sooner if there is a problem.  I will continue to keep you informed of his progress.            Thank you for allowing me to participate in the care of this patient.  If you have any questions please don't hesitate to contact me.     Dc Renner MD [facility]   10/2/2020  11:36 AM

## 2020-10-02 NOTE — PROGRESS NOTES
Cardiology Note       Reason for visit: Coronary artery disease      Solis returns today for follow-up accompanied by his son.  He has had some increasing lower extremity edema but no PND orthopnea.  His dyspnea on exertion has been unchanged.    His main complaint is that of his joint pain as well as his peripheral neuropathy.  There has not been any TIA-like symptoms.  He has not any slurred speech, unilateral weakness or visual loss.    Dimitris is able to do his activities of daily living without any difficulty.  There has not been any recent hospitalizations or emergency room visits.        Past Medical History:   Diagnosis Date   • CAD (coronary artery disease)    • Cellulitis 10/2017    LLE   • Diabetes (CMS/[facility])    • Hypertension    • Lipid disorder    • Peripheral neuropathy    • PVD (peripheral vascular disease) (CMS/[facility])      Past Surgical History:   Procedure Laterality Date   • CORONARY ANGIOPLASTY  1996    PTCA of LAD     No known allergies  Current Outpatient Medications   Medication Sig Dispense Refill   • ammonium lactate (LAC-HYDRIN) 12 % lotion APPLY A THIN LAYER TO THE BODY OF THE AFFECTED AREAS     • ascorbic acid (ascorbic acid with justino hips) 500 mg tablet Take 500 mg by mouth daily.     • aspirin (ASPIRIN LOW DOSE) 81 mg enteric coated tablet Take 81 mg by mouth daily.     • atorvastatin (LIPITOR) 40 mg tablet Take 40 mg by mouth daily.     • cholecalciferol, vitamin D3, (VITAMIN D3 ORAL) Take 1 tablet by mouth daily.       • FREESTYLE LITE STRIPS strip 2 (two) times a day. USE 1 BY MISC.(NON-DRUG COMBO ROUTE) ROUTE 2 TIMES EVERY DAY  2   • furosemide (LASIX) 20 mg tablet Take 20 mg by mouth 2 (two) times a day.     • gabapentin (NEURONTIN) 300 mg capsule Take 1 capsule by mouth 2 (two) times a day.       • glimepiride (AMARYL) 4 mg tablet Take 4 mg by mouth 2 (two) times a day.     • glucosamine sulfate (GLUCOSAMINE) 500 mg tablet Take 500 mg by mouth daily.     • ketoconazole (NIZORAL) 2 %  cream APPLY A THIN LAYER TO FEET AND TOENAILS EVERY DAY     • latanoprost (XALATAN) 0.005 % ophthalmic solution Administer 1 drop into affected eye(s) daily.     • metFORMIN XR (GLUCOPHAGE-XR) 750 mg 24 hr tablet Take 750 mg by mouth every evening. With evening meal     • multivitamin (THERAGRAN) tablet Take 1 tablet by mouth daily. With food     • omega 3-dha-epa-fish oil 1,000 mg (120 mg-180 mg) capsule Take 2-3 tablets by mouth daily.     • triamcinolone (KENALOG) 0.1 % cream Apply topically 2 (two) times a day. psorasis     • valsartan (DIOVAN) 80 mg tablet Take 80 mg by mouth once daily.     • vitamin E 400 unit capsule Take 1 tablet by mouth daily.       No current facility-administered medications for this visit.      Social History     Socioeconomic History   • Marital status:      Spouse name: None   • Number of children: None   • Years of education: None   • Highest education level: None   Occupational History   • None   Social Needs   • Financial resource strain: None   • Food insecurity     Worry: None     Inability: None   • Transportation needs     Medical: None     Non-medical: None   Tobacco Use   • Smoking status: Never Smoker   • Smokeless tobacco: Never Used   Substance and Sexual Activity   • Alcohol use: None   • Drug use: None   • Sexual activity: None   Lifestyle   • Physical activity     Days per week: None     Minutes per session: None   • Stress: None   Relationships   • Social connections     Talks on phone: None     Gets together: None     Attends Yarsanism service: None     Active member of club or organization: None     Attends meetings of clubs or organizations: None     Relationship status: None   • Intimate partner violence     Fear of current or ex partner: None     Emotionally abused: None     Physically abused: None     Forced sexual activity: None   Other Topics Concern   • None   Social History Narrative   • None     Family History   Problem Relation Age of Onset   •  Heart attack Neg Hx          Review of Systems   HENT: Negative for nosebleeds.    Eyes: Negative for visual disturbance.   Cardiovascular: Positive for leg swelling. Negative for chest pain, claudication, dyspnea on exertion, irregular heartbeat, near-syncope, orthopnea, palpitations, paroxysmal nocturnal dyspnea and syncope.   Respiratory: Negative for cough and shortness of breath.    Hematologic/Lymphatic: Does not bruise/bleed easily.   Skin: Positive for rash.   Musculoskeletal: Negative for back pain, joint pain and myalgias.   Gastrointestinal: Negative for abdominal pain, diarrhea, nausea and vomiting.   Genitourinary: Negative for hematuria.   Neurological: Positive for numbness. Negative for dizziness and weakness.        Peripheral edema   Psychiatric/Behavioral: Negative for altered mental status.   Allergic/Immunologic: Negative for hives.      Objective    Vitals:    10/02/20 1027   BP: 136/60   Pulse: 72   Resp: 14      Wt Readings from Last 3 Encounters:   11/21/19 91.1 kg (200 lb 12.8 oz)   06/05/19 90.7 kg (200 lb)   12/03/18 93.4 kg (206 lb)      Physical Exam   Constitutional: He is oriented to person, place, and time. He appears well-developed and well-nourished.   HENT:   Head: Normocephalic and atraumatic.   Eyes: No scleral icterus.   Neck: No JVD present. Carotid bruit is not present.   Cardiovascular: Normal rate, regular rhythm, S1 normal and S2 normal. Exam reveals gallop and S4. Exam reveals no friction rub.   No murmur heard.  Pulses:       Carotid pulses are 2+ on the right side and 2+ on the left side.       Dorsalis pedis pulses are 2+ on the right side and 2+ on the left side.        Posterior tibial pulses are 2+ on the right side and 2+ on the left side.   Pulmonary/Chest: Effort normal and breath sounds normal.   Abdominal: Soft. Bowel sounds are normal. There is no abdominal tenderness.   Musculoskeletal:         General: Edema present.      Comments: 2 + edema of the lower  extremities bilaterally   Neurological: He is alert and oriented to person, place, and time.   Skin: Skin is warm and dry.   Psychiatric: He has a normal mood and affect. His behavior is normal.                 Cardiovascular Procedures:  CATH LAB:  PCI (PTCA LAD) - 1996   ECHO/MUGA:  Echo (Left ventricular hypertrophy. Ejection fraction 55-60%. Aortic sclerosis.) - 10/3/2012   Echo (EF 0.60 (60%), Hypertensive heart disease.  Left ventricular hypertrophy.  Aortic sclerosis.  Decreased left ventricular compliance.) - 10/21/2015   STRESS TESTS:  Prasad MPI (Normal nuclear perfusion stress test. Images reveal normal wall motion. Ejection fraction 64%.) - 10/5/2012        ECG.  Normal sinus rhythm, blocked PAC, first-degree AV block, right bundle branch block and left axis deviation.  Other than blocked PAC no change compared to previous EKG.     Assessment/Plan    Benign essential hypertension  Blood pressure is controlled.  He will continue on the valsartan 80 mg daily.    I feel that Dimitris has mild volume overload.  He is currently taking Lasix 20 mg twice a day.  I have increased him to 40 mg twice a day for the next 3 days and then back to 20 mg twice daily.    His son will monitor his lower extremities as well as his weight.  I told him if he starts to see that he persistently has increasing edema and he may need a higher baseline dose of diuretics such as Lasix 40 mg twice daily.    I have counseled Dimitris on a low-salt diet.    Coronary artery disease involving native coronary artery without angina pectoris  No anginal symptoms.  Dimitris will continue on the aspirin, and statin therapy.  His EKG is unchanged.    Mixed hyperlipidemia  Dimitris will continue on atorvastatin 40 mg daily along with the fish oil.  His LDL last year was 56.    Solis will be coming due for his day-to-day care.  Of asked him to follow-up with me in 6 months time or sooner if there is a problem.  I will continue to keep you informed of his progress.             Thank you for allowing me to participate in the care of this patient.  If you have any questions please don't hesitate to contact me.     Dc Renner MD [facility]   10/2/2020  11:36 AM

## 2020-10-02 NOTE — ASSESSMENT & PLAN NOTE
No anginal symptoms.  Dimitris will continue on the aspirin, and statin therapy.  His EKG is unchanged.

## 2020-12-03 ENCOUNTER — APPOINTMENT (RX ONLY)
Dept: URBAN - METROPOLITAN AREA CLINIC 28 | Facility: CLINIC | Age: 86
Setting detail: DERMATOLOGY
End: 2020-12-03

## 2020-12-03 DIAGNOSIS — B35.1 TINEA UNGUIUM: ICD-10-CM

## 2020-12-03 DIAGNOSIS — L40.4 GUTTATE PSORIASIS: ICD-10-CM | Status: IMPROVED

## 2020-12-03 PROCEDURE — 99213 OFFICE O/P EST LOW 20 MIN: CPT

## 2020-12-03 PROCEDURE — ? PRESCRIPTION MEDICATION MANAGEMENT

## 2020-12-03 PROCEDURE — ? PHOTO-DOCUMENTATION

## 2020-12-03 PROCEDURE — ? COUNSELING

## 2020-12-03 ASSESSMENT — LOCATION SIMPLE DESCRIPTION DERM
LOCATION SIMPLE: RIGHT PRETIBIAL REGION
LOCATION SIMPLE: LEFT FOOT
LOCATION SIMPLE: LEFT ELBOW
LOCATION SIMPLE: RIGHT FOOT
LOCATION SIMPLE: RIGHT FOREARM
LOCATION SIMPLE: LEFT POPLITEAL SKIN

## 2020-12-03 ASSESSMENT — LOCATION DETAILED DESCRIPTION DERM
LOCATION DETAILED: LEFT MEDIAL POPLITEAL SKIN
LOCATION DETAILED: RIGHT PROXIMAL ULNAR DORSAL FOREARM
LOCATION DETAILED: RIGHT DISTAL PRETIBIAL REGION
LOCATION DETAILED: RIGHT MEDIAL DISTAL PRETIBIAL REGION
LOCATION DETAILED: RIGHT DORSAL FOOT
LOCATION DETAILED: LEFT DORSAL FOOT
LOCATION DETAILED: RIGHT PROXIMAL PRETIBIAL REGION
LOCATION DETAILED: LEFT ELBOW
LOCATION DETAILED: RIGHT DISTAL DORSAL FOREARM

## 2020-12-03 ASSESSMENT — LOCATION ZONE DERM
LOCATION ZONE: LEG
LOCATION ZONE: ARM
LOCATION ZONE: FEET

## 2020-12-03 NOTE — PROCEDURE: PRESCRIPTION MEDICATION MANAGEMENT
Discontinue Regimen: amoxicillin 500mg tablet: Take one tablet po BID with food x 7 days (finished course)
Continue Regimen: triamcinolone acetonide 0.1% topical cream: Apply bid to the psoriasis of body (not face)\\nammonium lactate 12% lotion: Apply a thin layer to the body of the affected areas
Render In Strict Bullet Format?: No
Detail Level: Zone
Continue Regimen: ketoconazole 2% topical cream: Apply a thin layer to feet and toenails QDAY
Plan: terbinafine after pt has labs in Feb with PCP

## 2021-03-25 ENCOUNTER — TELEPHONE (OUTPATIENT)
Dept: SCHEDULING | Facility: CLINIC | Age: 86
End: 2021-03-25

## 2021-03-25 NOTE — TELEPHONE ENCOUNTER
I called and spoke to Dimitris Shrestha.  He tells me that his father had been having nausea and vomiting and when they evaluated him he was found to have liver cancer.  He was placed on hospice and passed away peacefully this morning.  I conveyed my condolences to Dimitris about his father.

## 2021-03-25 NOTE — TELEPHONE ENCOUNTER
Solis Alexandra called to inform Dr. Hester pt passed away this morning. Solis will be at the  [facility] this evening and if Dr. Hester can't reach him, message can be left. Solis Alexandra can be reach at [phone]